# Patient Record
Sex: FEMALE | Race: WHITE | NOT HISPANIC OR LATINO | URBAN - METROPOLITAN AREA
[De-identification: names, ages, dates, MRNs, and addresses within clinical notes are randomized per-mention and may not be internally consistent; named-entity substitution may affect disease eponyms.]

---

## 2017-03-06 ENCOUNTER — OFFICE VISIT (OUTPATIENT)
Dept: WOUND CARE | Facility: HOSPITAL | Age: 82
End: 2017-03-06
Attending: SURGERY
Payer: MEDICARE

## 2017-03-06 VITALS
DIASTOLIC BLOOD PRESSURE: 64 MMHG | SYSTOLIC BLOOD PRESSURE: 111 MMHG | TEMPERATURE: 98 F | HEART RATE: 85 BPM | RESPIRATION RATE: 18 BRPM

## 2017-03-06 DIAGNOSIS — L89.623 STAGE III PRESSURE ULCER OF LEFT HEEL: ICD-10-CM

## 2017-03-06 DIAGNOSIS — L89.623 PRESSURE ULCER OF LEFT HEEL, STAGE 3: Primary | ICD-10-CM

## 2017-03-06 DIAGNOSIS — I73.9 PERIPHERAL VASCULAR DISEASE OF EXTREMITY: ICD-10-CM

## 2017-03-06 PROCEDURE — 27201912 HC WOUND CARE DEBRIDEMENT SUPPLIES

## 2017-03-06 PROCEDURE — 11045 DBRDMT SUBQ TISS EACH ADDL: CPT

## 2017-03-06 PROCEDURE — 11042 DBRDMT SUBQ TIS 1ST 20SQCM/<: CPT

## 2017-03-06 PROCEDURE — 99203 OFFICE O/P NEW LOW 30 MIN: CPT | Mod: 25

## 2017-03-06 RX ORDER — LEVOTHYROXINE SODIUM 75 UG/1
75 TABLET ORAL DAILY
COMMUNITY

## 2017-03-06 RX ORDER — MULTIVITAMIN
1 TABLET ORAL DAILY
COMMUNITY

## 2017-03-06 RX ORDER — LISINOPRIL 20 MG/1
20 TABLET ORAL DAILY
COMMUNITY

## 2017-03-06 RX ORDER — OLANZAPINE 5 MG/1
5 TABLET ORAL DAILY PRN
COMMUNITY

## 2017-03-06 RX ORDER — OLANZAPINE 20 MG/1
30 TABLET ORAL NIGHTLY
COMMUNITY

## 2017-03-06 RX ORDER — DOCUSATE SODIUM 100 MG/1
100 CAPSULE, LIQUID FILLED ORAL DAILY
COMMUNITY

## 2017-03-06 RX ORDER — ADHESIVE BANDAGE
30 BANDAGE TOPICAL DAILY PRN
COMMUNITY

## 2017-03-06 RX ORDER — ASPIRIN 81 MG/1
81 TABLET ORAL DAILY
COMMUNITY

## 2017-03-06 RX ORDER — DIVALPROEX SODIUM 250 MG/1
500 TABLET, FILM COATED, EXTENDED RELEASE ORAL EVERY 12 HOURS
COMMUNITY

## 2017-03-06 RX ORDER — OXYBUTYNIN CHLORIDE 10 MG/1
10 TABLET, EXTENDED RELEASE ORAL DAILY
COMMUNITY

## 2017-03-06 RX ORDER — ALBUTEROL SULFATE 0.63 MG/3ML
0.63 SOLUTION RESPIRATORY (INHALATION) 4 TIMES DAILY PRN
COMMUNITY

## 2017-03-06 RX ORDER — LANOLIN ALCOHOL/MO/W.PET/CERES
1000 CREAM (GRAM) TOPICAL DAILY
COMMUNITY

## 2017-03-06 RX ORDER — PRAVASTATIN SODIUM 40 MG/1
40 TABLET ORAL DAILY
COMMUNITY

## 2017-03-06 RX ORDER — ALENDRONATE SODIUM 70 MG/1
70 TABLET ORAL
COMMUNITY

## 2017-03-06 NOTE — PROGRESS NOTES
The documentation for this encounter was completed in [facility].Please see the scanned in documents for the details.    Tyler Rosa MD

## 2017-03-06 NOTE — MR AVS SNAPSHOT
[facility] [address] [place] [address]  Phone: [phone]  Fax: [phone]                  Mariya Contreras   3/6/2017 8:30 AM   Office Visit    Description:  Female : 1934   Provider:  Tyler Rosa MD   Department:  [facility]           Diagnoses this Visit        Comments    Pressure ulcer of left heel, stage 3    -  Primary     Peripheral vascular disease of extremity         Stage III pressure ulcer of left heel                To Do List           Goals (5 Years of Data)     None      [facility]     [facility] -  Assistance  Registered nurses in the [facility] provide clinical advisement, health education, appointment booking, and other advisory services.  Call for this free service at [phone].             Medications           Message regarding Medications     Verify the changes and/or additions to your medication regime listed below are the same as discussed with your clinician today.  If any of these changes or additions are incorrect, please notify your healthcare provider.             Verify that the below list of medications is an accurate representation of the medications you are currently taking.  If none reported, the list may be blank. If incorrect, please contact your healthcare provider. Carry this list with you in case of emergency.           Current Medications     albuterol (ACCUNEB) 0.63 mg/3 mL Nebu Take 0.63 mg by nebulization 4 (four) times daily as needed. Rescue    alendronate (FOSAMAX) 70 MG tablet Take 70 mg by mouth every 7 days.    ascorbic Acid (VITAMIN C) 500 mg CpSR Take 1,000 mg by mouth once daily.    aspirin (ECOTRIN) 81 MG EC tablet Take 81 mg by mouth once daily.    divalproex ER (DEPAKOTE ER) 250 MG 24 hr tablet Take 500 mg by mouth every 12 (twelve) hours.    docusate sodium (COLACE) 100 MG capsule Take 100 mg by mouth once daily.    levothyroxine (SYNTHROID)  75 MCG tablet Take 75 mcg by mouth once daily.    lisinopril (PRINIVIL,ZESTRIL) 20 MG tablet Take 20 mg by mouth once daily.    magnesium hydroxide 400 mg/5 ml (MILK OF MAGNESIA) 400 mg/5 mL Susp Take 30 mLs by mouth daily as needed.    multivitamin (ONE DAILY MULTIVITAMIN) per tablet Take 1 tablet by mouth once daily.    olanzapine (ZYPREXA) 20 MG tablet Take 30 mg by mouth every evening.    olanzapine (ZYPREXA) 5 MG tablet Take 5 mg by mouth daily as needed.    oxybutynin (DITROPAN-XL) 10 MG 24 hr tablet Take 10 mg by mouth once daily.    pravastatin (PRAVACHOL) 40 MG tablet Take 40 mg by mouth once daily.           Clinical Reference Information           Your Vitals Were     BP Pulse Temp Resp          111/64 85 98.1 °F (36.7 °C) (Temporal) 18        Blood Pressure          Most Recent Value    BP  111/64      Allergies as of 3/6/2017     No Known Allergies      Immunizations Administered on Date of Encounter - 3/6/2017     None      [facility] Sign-Up     Activating your [facility] account is as easy as 1-2-3!     1) Visit [email or web address], select Sign Up Now, enter this activation code and your date of birth, then select Next.  [email or web address]  Expires: 4/20/2017  5:53 PM      2) Create a username and password to use when you visit [facility] in the future and select a security question in case you lose your password and select Next.    3) Enter your e-mail address and click Sign Up!    Additional Information  If you have questions, please e-mail [email or web address] or call [phone] to talk to our [facility] staff. Remember, [facility] is NOT to be used for urgent needs. For medical emergencies, dial 911.         Smoking Cessation     If you would like to quit smoking:   You may be eligible for free services if you are a [place] resident and started smoking cigarettes before September 1, 1988.  Call the [employer]oking [employer]ess[employer] ([employer]) toll free at (547) 493-3911 or (029) 679-0237.   Call 1-800-QUIT-NOW if  you do not meet the above criteria.            Language Assistance Services     ATTENTION: Language assistance services are available, free of charge. Please call [phone].      ATENCIÓN: Si habla brando, tiene a hernandez disposición servicios gratuitos de asistencia lingüística. Llame al [phone].     CHÚ Ý: N?u b?n nói Ti?ng Vi?t, có các d?ch v? h? tr? ngôn ng? mi?n phí dành cho b?n. G?i s? [phone].         [facility] complies with applicable Federal civil rights laws and does not discriminate on the basis of race, color, national origin, age, disability, or sex.

## 2017-03-13 ENCOUNTER — OFFICE VISIT (OUTPATIENT)
Dept: WOUND CARE | Facility: HOSPITAL | Age: 82
End: 2017-03-13
Attending: SURGERY
Payer: MEDICARE

## 2017-03-13 VITALS — DIASTOLIC BLOOD PRESSURE: 99 MMHG | RESPIRATION RATE: 20 BRPM | SYSTOLIC BLOOD PRESSURE: 121 MMHG | HEART RATE: 80 BPM

## 2017-03-13 DIAGNOSIS — M24.572 EQUINUS CONTRACTURE OF LEFT ANKLE: ICD-10-CM

## 2017-03-13 DIAGNOSIS — I70.244 ATHEROSCLEROSIS OF NATIVE ARTERY OF LEFT LOWER EXTREMITY WITH ULCERATION OF HEEL: ICD-10-CM

## 2017-03-13 DIAGNOSIS — L89.624 STAGE IV PRESSURE ULCER OF LEFT HEEL: Primary | ICD-10-CM

## 2017-03-13 PROCEDURE — 11042 DBRDMT SUBQ TIS 1ST 20SQCM/<: CPT

## 2017-03-13 PROCEDURE — 11045 DBRDMT SUBQ TISS EACH ADDL: CPT

## 2017-03-13 PROCEDURE — 27201912 HC WOUND CARE DEBRIDEMENT SUPPLIES

## 2017-03-20 ENCOUNTER — OFFICE VISIT (OUTPATIENT)
Dept: WOUND CARE | Facility: HOSPITAL | Age: 82
End: 2017-03-20
Attending: SURGERY
Payer: MEDICARE

## 2017-03-20 VITALS — SYSTOLIC BLOOD PRESSURE: 116 MMHG | DIASTOLIC BLOOD PRESSURE: 58 MMHG | RESPIRATION RATE: 20 BRPM | HEART RATE: 83 BPM

## 2017-03-20 DIAGNOSIS — I70.244 ATHEROSCLEROSIS OF NATIVE ARTERY OF LEFT LOWER EXTREMITY WITH ULCERATION OF HEEL: ICD-10-CM

## 2017-03-20 DIAGNOSIS — L89.624 STAGE IV PRESSURE ULCER OF LEFT HEEL: Primary | ICD-10-CM

## 2017-03-20 PROCEDURE — 27201912 HC WOUND CARE DEBRIDEMENT SUPPLIES

## 2017-03-20 PROCEDURE — 11042 DBRDMT SUBQ TIS 1ST 20SQCM/<: CPT

## 2017-04-03 ENCOUNTER — OFFICE VISIT (OUTPATIENT)
Dept: WOUND CARE | Facility: HOSPITAL | Age: 82
End: 2017-04-03
Attending: SURGERY
Payer: MEDICARE

## 2017-04-03 VITALS — RESPIRATION RATE: 22 BRPM | DIASTOLIC BLOOD PRESSURE: 78 MMHG | SYSTOLIC BLOOD PRESSURE: 100 MMHG | HEART RATE: 73 BPM

## 2017-04-03 DIAGNOSIS — M24.572 EQUINUS CONTRACTURE OF LEFT ANKLE: ICD-10-CM

## 2017-04-03 DIAGNOSIS — I70.244 ATHEROSCLEROSIS OF NATIVE ARTERY OF LEFT LOWER EXTREMITY WITH ULCERATION OF HEEL: ICD-10-CM

## 2017-04-03 DIAGNOSIS — L89.624 STAGE IV PRESSURE ULCER OF LEFT HEEL: Primary | ICD-10-CM

## 2017-04-03 PROCEDURE — 27201912 HC WOUND CARE DEBRIDEMENT SUPPLIES

## 2017-04-03 PROCEDURE — 11042 DBRDMT SUBQ TIS 1ST 20SQCM/<: CPT

## 2017-04-10 ENCOUNTER — OFFICE VISIT (OUTPATIENT)
Dept: WOUND CARE | Facility: HOSPITAL | Age: 82
End: 2017-04-10
Attending: SURGERY
Payer: MEDICARE

## 2017-04-10 VITALS
HEART RATE: 88 BPM | SYSTOLIC BLOOD PRESSURE: 107 MMHG | RESPIRATION RATE: 18 BRPM | DIASTOLIC BLOOD PRESSURE: 61 MMHG | TEMPERATURE: 98 F

## 2017-04-10 DIAGNOSIS — I70.244 ATHEROSCLEROSIS OF NATIVE ARTERY OF LEFT LOWER EXTREMITY WITH ULCERATION OF HEEL: Primary | ICD-10-CM

## 2017-04-10 DIAGNOSIS — L89.624 STAGE IV PRESSURE ULCER OF LEFT HEEL: ICD-10-CM

## 2017-04-10 DIAGNOSIS — M24.572 EQUINUS CONTRACTURE OF LEFT ANKLE: ICD-10-CM

## 2017-04-10 DIAGNOSIS — I70.262 ATHEROSCLEROSIS OF NATIVE ARTERY OF LEFT LOWER EXTREMITY WITH GANGRENE: ICD-10-CM

## 2017-04-10 PROCEDURE — 27201912 HC WOUND CARE DEBRIDEMENT SUPPLIES

## 2017-04-10 PROCEDURE — 11043 DBRDMT MUSC&/FSCA 1ST 20/<: CPT

## 2017-05-01 ENCOUNTER — OFFICE VISIT (OUTPATIENT)
Dept: WOUND CARE | Facility: HOSPITAL | Age: 82
End: 2017-05-01
Attending: SURGERY
Payer: MEDICARE

## 2017-05-01 VITALS — RESPIRATION RATE: 20 BRPM | SYSTOLIC BLOOD PRESSURE: 98 MMHG | HEART RATE: 84 BPM | DIASTOLIC BLOOD PRESSURE: 61 MMHG

## 2017-05-01 DIAGNOSIS — M24.572 EQUINUS CONTRACTURE OF LEFT ANKLE: ICD-10-CM

## 2017-05-01 DIAGNOSIS — I70.244 ATHEROSCLEROSIS OF NATIVE ARTERY OF LEFT LOWER EXTREMITY WITH ULCERATION OF HEEL: ICD-10-CM

## 2017-05-01 DIAGNOSIS — L89.624 STAGE IV PRESSURE ULCER OF LEFT HEEL: Primary | ICD-10-CM

## 2017-05-01 DIAGNOSIS — I70.262 ATHEROSCLEROSIS OF NATIVE ARTERY OF LEFT LOWER EXTREMITY WITH GANGRENE: ICD-10-CM

## 2017-05-01 PROCEDURE — 27201912 HC WOUND CARE DEBRIDEMENT SUPPLIES

## 2017-05-01 PROCEDURE — 11042 DBRDMT SUBQ TIS 1ST 20SQCM/<: CPT

## 2017-05-29 ENCOUNTER — OFFICE VISIT (OUTPATIENT)
Dept: WOUND CARE | Facility: HOSPITAL | Age: 82
End: 2017-05-29
Attending: SURGERY
Payer: MEDICARE

## 2017-05-29 VITALS — RESPIRATION RATE: 20 BRPM | HEART RATE: 81 BPM | SYSTOLIC BLOOD PRESSURE: 96 MMHG | DIASTOLIC BLOOD PRESSURE: 62 MMHG

## 2017-05-29 DIAGNOSIS — L89.624 STAGE IV PRESSURE ULCER OF LEFT HEEL: Primary | ICD-10-CM

## 2017-05-29 DIAGNOSIS — M24.572 EQUINUS CONTRACTURE OF LEFT ANKLE: ICD-10-CM

## 2017-05-29 DIAGNOSIS — I70.244 ATHEROSCLEROSIS OF NATIVE ARTERY OF LEFT LOWER EXTREMITY WITH ULCERATION OF HEEL: ICD-10-CM

## 2017-05-29 PROCEDURE — 27201912 HC WOUND CARE DEBRIDEMENT SUPPLIES

## 2017-05-29 PROCEDURE — 11043 DBRDMT MUSC&/FSCA 1ST 20/<: CPT

## 2017-06-26 ENCOUNTER — OFFICE VISIT (OUTPATIENT)
Dept: WOUND CARE | Facility: HOSPITAL | Age: 82
End: 2017-06-26
Attending: SURGERY
Payer: MEDICARE

## 2017-06-26 VITALS
DIASTOLIC BLOOD PRESSURE: 77 MMHG | SYSTOLIC BLOOD PRESSURE: 132 MMHG | TEMPERATURE: 98 F | RESPIRATION RATE: 20 BRPM | HEART RATE: 91 BPM

## 2017-06-26 DIAGNOSIS — I70.25: ICD-10-CM

## 2017-06-26 DIAGNOSIS — I70.244 ATHEROSCLEROSIS OF NATIVE ARTERY OF LEFT LOWER EXTREMITY WITH ULCERATION OF HEEL: ICD-10-CM

## 2017-06-26 DIAGNOSIS — I73.9 PERIPHERAL VASCULAR DISEASE OF EXTREMITY: ICD-10-CM

## 2017-06-26 DIAGNOSIS — L89.624 STAGE IV PRESSURE ULCER OF LEFT HEEL: Primary | ICD-10-CM

## 2017-06-26 PROCEDURE — 11042 DBRDMT SUBQ TIS 1ST 20SQCM/<: CPT

## 2017-06-26 PROCEDURE — 99212 OFFICE O/P EST SF 10 MIN: CPT | Mod: 25

## 2017-06-26 PROCEDURE — 27201912 HC WOUND CARE DEBRIDEMENT SUPPLIES

## 2017-07-10 ENCOUNTER — OFFICE VISIT (OUTPATIENT)
Dept: WOUND CARE | Facility: HOSPITAL | Age: 82
End: 2017-07-10
Attending: SURGERY
Payer: MEDICARE

## 2017-07-10 VITALS — RESPIRATION RATE: 20 BRPM | HEART RATE: 90 BPM | SYSTOLIC BLOOD PRESSURE: 130 MMHG | DIASTOLIC BLOOD PRESSURE: 78 MMHG

## 2017-07-10 DIAGNOSIS — I70.244 ATHEROSCLEROSIS OF NATIVE ARTERY OF LEFT LOWER EXTREMITY WITH ULCERATION OF HEEL: ICD-10-CM

## 2017-07-10 DIAGNOSIS — L97.523: ICD-10-CM

## 2017-07-10 DIAGNOSIS — L89.624 STAGE IV PRESSURE ULCER OF LEFT HEEL: Primary | ICD-10-CM

## 2017-07-10 DIAGNOSIS — M24.572 EQUINUS CONTRACTURE OF LEFT ANKLE: ICD-10-CM

## 2017-07-10 PROCEDURE — 27201912 HC WOUND CARE DEBRIDEMENT SUPPLIES

## 2017-07-10 PROCEDURE — 11043 DBRDMT MUSC&/FSCA 1ST 20/<: CPT

## 2017-07-17 ENCOUNTER — OFFICE VISIT (OUTPATIENT)
Dept: WOUND CARE | Facility: HOSPITAL | Age: 82
End: 2017-07-17
Attending: SURGERY
Payer: MEDICARE

## 2017-07-17 VITALS
SYSTOLIC BLOOD PRESSURE: 98 MMHG | RESPIRATION RATE: 20 BRPM | HEART RATE: 94 BPM | DIASTOLIC BLOOD PRESSURE: 54 MMHG | TEMPERATURE: 98 F

## 2017-07-17 DIAGNOSIS — L89.624 STAGE IV PRESSURE ULCER OF LEFT HEEL: ICD-10-CM

## 2017-07-17 DIAGNOSIS — I70.25: ICD-10-CM

## 2017-07-17 DIAGNOSIS — I70.244 ATHEROSCLEROSIS OF NATIVE ARTERY OF LEFT LOWER EXTREMITY WITH ULCERATION OF HEEL: ICD-10-CM

## 2017-07-17 DIAGNOSIS — I73.9 PERIPHERAL VASCULAR DISEASE OF EXTREMITY: ICD-10-CM

## 2017-07-17 DIAGNOSIS — L89.623 PRESSURE ULCER OF LEFT HEEL, STAGE 3: ICD-10-CM

## 2017-07-17 DIAGNOSIS — L97.523: Primary | ICD-10-CM

## 2017-07-17 PROCEDURE — 27201912 HC WOUND CARE DEBRIDEMENT SUPPLIES

## 2017-07-17 PROCEDURE — 11042 DBRDMT SUBQ TIS 1ST 20SQCM/<: CPT

## 2017-08-07 ENCOUNTER — OFFICE VISIT (OUTPATIENT)
Dept: WOUND CARE | Facility: HOSPITAL | Age: 82
End: 2017-08-07
Attending: SURGERY
Payer: MEDICARE

## 2017-08-07 VITALS
RESPIRATION RATE: 20 BRPM | DIASTOLIC BLOOD PRESSURE: 69 MMHG | SYSTOLIC BLOOD PRESSURE: 111 MMHG | HEART RATE: 97 BPM | TEMPERATURE: 98 F

## 2017-08-07 DIAGNOSIS — L89.624 STAGE IV PRESSURE ULCER OF LEFT HEEL: Primary | ICD-10-CM

## 2017-08-07 PROCEDURE — 11044 DBRDMT BONE 1ST 20 SQ CM/<: CPT

## 2017-08-07 PROCEDURE — 27201912 HC WOUND CARE DEBRIDEMENT SUPPLIES

## 2017-08-28 ENCOUNTER — OFFICE VISIT (OUTPATIENT)
Dept: WOUND CARE | Facility: HOSPITAL | Age: 82
End: 2017-08-28
Attending: SURGERY
Payer: MEDICARE

## 2017-08-28 VITALS
RESPIRATION RATE: 18 BRPM | HEART RATE: 88 BPM | SYSTOLIC BLOOD PRESSURE: 138 MMHG | TEMPERATURE: 98 F | DIASTOLIC BLOOD PRESSURE: 82 MMHG

## 2017-08-28 DIAGNOSIS — L89.624 STAGE IV PRESSURE ULCER OF LEFT HEEL: Primary | ICD-10-CM

## 2017-08-28 DIAGNOSIS — I73.9 PERIPHERAL VASCULAR DISEASE OF EXTREMITY: ICD-10-CM

## 2017-08-28 DIAGNOSIS — I70.244 ATHEROSCLEROSIS OF NATIVE ARTERY OF LEFT LOWER EXTREMITY WITH ULCERATION OF HEEL: ICD-10-CM

## 2017-08-28 DIAGNOSIS — L97.523: ICD-10-CM

## 2017-08-28 PROCEDURE — 11042 DBRDMT SUBQ TIS 1ST 20SQCM/<: CPT

## 2017-08-28 PROCEDURE — 27201912 HC WOUND CARE DEBRIDEMENT SUPPLIES

## 2017-09-18 ENCOUNTER — OFFICE VISIT (OUTPATIENT)
Dept: WOUND CARE | Facility: HOSPITAL | Age: 82
End: 2017-09-18
Attending: SURGERY
Payer: MEDICARE

## 2017-09-18 VITALS
TEMPERATURE: 98 F | HEART RATE: 89 BPM | DIASTOLIC BLOOD PRESSURE: 57 MMHG | SYSTOLIC BLOOD PRESSURE: 80 MMHG | RESPIRATION RATE: 18 BRPM

## 2017-09-18 DIAGNOSIS — L89.624 STAGE IV PRESSURE ULCER OF LEFT HEEL: Primary | ICD-10-CM

## 2017-09-18 DIAGNOSIS — I70.244 ATHEROSCLEROSIS OF NATIVE ARTERY OF LEFT LOWER EXTREMITY WITH ULCERATION OF HEEL: ICD-10-CM

## 2017-09-18 DIAGNOSIS — L97.523: ICD-10-CM

## 2017-09-18 DIAGNOSIS — I73.9 PERIPHERAL VASCULAR DISEASE OF EXTREMITY: ICD-10-CM

## 2017-09-18 DIAGNOSIS — L89.623 PRESSURE ULCER OF LEFT HEEL, STAGE 3: ICD-10-CM

## 2017-09-18 DIAGNOSIS — I70.25: ICD-10-CM

## 2017-09-18 PROCEDURE — 27201912 HC WOUND CARE DEBRIDEMENT SUPPLIES

## 2017-09-18 PROCEDURE — 11042 DBRDMT SUBQ TIS 1ST 20SQCM/<: CPT

## 2017-09-18 NOTE — PROGRESS NOTES
The documentation for this encounter was completed in [facility].Please see the scanned in documents for the details.    yTler Rosa MD     See wound care assessment documentation in chart review. Scanned under media tab.

## 2017-10-16 ENCOUNTER — OFFICE VISIT (OUTPATIENT)
Dept: WOUND CARE | Facility: HOSPITAL | Age: 82
End: 2017-10-16
Attending: SURGERY
Payer: MEDICARE

## 2017-10-16 VITALS
HEART RATE: 68 BPM | RESPIRATION RATE: 20 BRPM | TEMPERATURE: 98 F | SYSTOLIC BLOOD PRESSURE: 124 MMHG | DIASTOLIC BLOOD PRESSURE: 76 MMHG

## 2017-10-16 DIAGNOSIS — L89.624 STAGE IV PRESSURE ULCER OF LEFT HEEL: ICD-10-CM

## 2017-10-16 PROCEDURE — 27201912 HC WOUND CARE DEBRIDEMENT SUPPLIES

## 2017-10-16 PROCEDURE — 11042 DBRDMT SUBQ TIS 1ST 20SQCM/<: CPT

## 2017-10-16 NOTE — PROGRESS NOTES
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  Patient has significant peripheral vascular disease.  She underwent evaluation including angiogram and there was significant disease and no intervention was possible.  Patient has been placed on conservative therapy.  There is been no progression of the wound and actually there is been some granulation tissue which is formed over the bone.  There is been no signs of infection.  Patient does not complain of any significant pain other than we change the dressing or debride the wound.  There is no significant drainage.  Amputation is been offered but the family does not want to pursue amputation at this time.           Other Visit Diagnoses    None.         See wound doc progress notes. Documents will be scanned.        Tyler Rosa  [facility]

## 2017-11-13 ENCOUNTER — OFFICE VISIT (OUTPATIENT)
Dept: WOUND CARE | Facility: HOSPITAL | Age: 82
End: 2017-11-13
Attending: SURGERY
Payer: MEDICARE

## 2017-11-13 VITALS
TEMPERATURE: 99 F | SYSTOLIC BLOOD PRESSURE: 95 MMHG | HEART RATE: 90 BPM | RESPIRATION RATE: 20 BRPM | DIASTOLIC BLOOD PRESSURE: 64 MMHG

## 2017-11-13 DIAGNOSIS — L89.624 STAGE IV PRESSURE ULCER OF LEFT HEEL: ICD-10-CM

## 2017-11-13 PROCEDURE — 11042 DBRDMT SUBQ TIS 1ST 20SQCM/<: CPT

## 2017-11-13 PROCEDURE — 27201912 HC WOUND CARE DEBRIDEMENT SUPPLIES

## 2017-11-13 NOTE — PROGRESS NOTES
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  Patient has significant peripheral vascular disease.  She underwent evaluation including angiogram and there was significant disease and no intervention was possible.  Patient has been placed on conservative therapy.  There is been no progression of the wound and actually there is been some granulation tissue which is formed over the bone.  There is been no signs of infection.  Patient does not complain of any significant pain other than we change the dressing or debride the wound.  There is no significant drainage.  Amputation is been offered but the family does not want to pursue amputation at this time.  There is no worsening of the wounds.  There is no signs of infection.               See wound doc progress notes. Documents will be scanned.        Tyler Rosa  [facility]

## 2017-12-11 ENCOUNTER — OFFICE VISIT (OUTPATIENT)
Dept: WOUND CARE | Facility: HOSPITAL | Age: 82
End: 2017-12-11
Attending: SURGERY
Payer: MEDICARE

## 2017-12-11 VITALS
SYSTOLIC BLOOD PRESSURE: 132 MMHG | TEMPERATURE: 98 F | DIASTOLIC BLOOD PRESSURE: 84 MMHG | RESPIRATION RATE: 20 BRPM | HEART RATE: 88 BPM

## 2017-12-11 DIAGNOSIS — L97.524: ICD-10-CM

## 2017-12-11 DIAGNOSIS — M24.572 EQUINUS CONTRACTURE OF LEFT ANKLE: ICD-10-CM

## 2017-12-11 DIAGNOSIS — L89.624 STAGE IV PRESSURE ULCER OF LEFT HEEL: Primary | ICD-10-CM

## 2017-12-11 DIAGNOSIS — I70.244 ATHEROSCLEROSIS OF NATIVE ARTERY OF LEFT LOWER EXTREMITY WITH ULCERATION OF HEEL: ICD-10-CM

## 2017-12-11 PROBLEM — I70.262 ATHEROSCLEROSIS OF NATIVE ARTERY OF LEFT LOWER EXTREMITY WITH GANGRENE: Status: RESOLVED | Noted: 2017-04-10 | Resolved: 2017-12-11

## 2017-12-11 PROCEDURE — 11044 DBRDMT BONE 1ST 20 SQ CM/<: CPT

## 2017-12-11 PROCEDURE — 27201912 HC WOUND CARE DEBRIDEMENT SUPPLIES

## 2017-12-11 NOTE — PROGRESS NOTES
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  Patient has significant peripheral vascular disease.  She underwent evaluation including angiogram and there was significant disease and no intervention was possible.  Patient has been placed on conservative therapy.  There is been GOOD progression of the wound and actually there has been granulation tissue which has formed over the bone.  There has been no signs of infection.  Patient does not complain of any significant pain other than we change the dressing or debride the wound.  There is no significant drainage.  Amputation had been offered but the family does not want to pursue amputation.  There is no signs of infection.         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 Documents will be scanned.        Mic Landon  [facility]

## 2018-01-01 ENCOUNTER — OFFICE VISIT (OUTPATIENT)
Dept: WOUND CARE | Facility: HOSPITAL | Age: 83
End: 2018-01-01
Attending: SURGERY
Payer: MEDICARE

## 2018-01-01 VITALS
RESPIRATION RATE: 18 BRPM | TEMPERATURE: 98 F | HEART RATE: 88 BPM | SYSTOLIC BLOOD PRESSURE: 142 MMHG | DIASTOLIC BLOOD PRESSURE: 87 MMHG

## 2018-01-01 VITALS — HEART RATE: 56 BPM | SYSTOLIC BLOOD PRESSURE: 131 MMHG | DIASTOLIC BLOOD PRESSURE: 77 MMHG

## 2018-01-01 VITALS
TEMPERATURE: 98 F | HEART RATE: 88 BPM | DIASTOLIC BLOOD PRESSURE: 78 MMHG | SYSTOLIC BLOOD PRESSURE: 132 MMHG | RESPIRATION RATE: 18 BRPM

## 2018-01-01 VITALS — DIASTOLIC BLOOD PRESSURE: 68 MMHG | HEART RATE: 83 BPM | RESPIRATION RATE: 20 BRPM | SYSTOLIC BLOOD PRESSURE: 109 MMHG

## 2018-01-01 VITALS
HEART RATE: 77 BPM | TEMPERATURE: 98 F | RESPIRATION RATE: 18 BRPM | DIASTOLIC BLOOD PRESSURE: 76 MMHG | SYSTOLIC BLOOD PRESSURE: 132 MMHG

## 2018-01-01 VITALS
TEMPERATURE: 98 F | DIASTOLIC BLOOD PRESSURE: 72 MMHG | HEART RATE: 74 BPM | RESPIRATION RATE: 18 BRPM | SYSTOLIC BLOOD PRESSURE: 124 MMHG

## 2018-01-01 VITALS
DIASTOLIC BLOOD PRESSURE: 77 MMHG | TEMPERATURE: 98 F | RESPIRATION RATE: 18 BRPM | HEART RATE: 82 BPM | SYSTOLIC BLOOD PRESSURE: 132 MMHG

## 2018-01-01 DIAGNOSIS — L97.524: ICD-10-CM

## 2018-01-01 DIAGNOSIS — L89.624 STAGE IV PRESSURE ULCER OF LEFT HEEL: ICD-10-CM

## 2018-01-01 DIAGNOSIS — M24.572 EQUINUS CONTRACTURE OF LEFT ANKLE: ICD-10-CM

## 2018-01-01 DIAGNOSIS — L89.624 STAGE IV PRESSURE ULCER OF LEFT HEEL: Primary | ICD-10-CM

## 2018-01-01 DIAGNOSIS — M24.572 EQUINUS CONTRACTURE OF LEFT ANKLE: Primary | ICD-10-CM

## 2018-01-01 DIAGNOSIS — I70.244 ATHEROSCLEROSIS OF NATIVE ARTERY OF LEFT LOWER EXTREMITY WITH ULCERATION OF HEEL: ICD-10-CM

## 2018-01-01 DIAGNOSIS — I70.244 ATHEROSCLEROSIS OF NATIVE ARTERY OF LEFT LOWER EXTREMITY WITH ULCERATION OF HEEL: Primary | ICD-10-CM

## 2018-01-01 PROCEDURE — 99212 OFFICE O/P EST SF 10 MIN: CPT | Mod: 25

## 2018-01-01 PROCEDURE — 11042 DBRDMT SUBQ TIS 1ST 20SQCM/<: CPT

## 2018-01-01 PROCEDURE — 97597 DBRDMT OPN WND 1ST 20 CM/<: CPT

## 2018-01-01 PROCEDURE — 99499 UNLISTED E&M SERVICE: CPT | Mod: ,,, | Performed by: SURGERY

## 2018-01-01 PROCEDURE — 27201912 HC WOUND CARE DEBRIDEMENT SUPPLIES

## 2018-01-01 PROCEDURE — 99211 OFF/OP EST MAY X REQ PHY/QHP: CPT

## 2018-01-08 ENCOUNTER — OFFICE VISIT (OUTPATIENT)
Dept: WOUND CARE | Facility: HOSPITAL | Age: 83
End: 2018-01-08
Attending: SURGERY
Payer: MEDICARE

## 2018-01-08 VITALS
SYSTOLIC BLOOD PRESSURE: 104 MMHG | DIASTOLIC BLOOD PRESSURE: 63 MMHG | TEMPERATURE: 98 F | HEART RATE: 87 BPM | RESPIRATION RATE: 20 BRPM

## 2018-01-08 DIAGNOSIS — L89.624 STAGE IV PRESSURE ULCER OF LEFT HEEL: Primary | ICD-10-CM

## 2018-01-08 PROCEDURE — 99211 OFF/OP EST MAY X REQ PHY/QHP: CPT

## 2018-01-08 NOTE — PROGRESS NOTES
[facility]  Wound Care  Progress Note    Problem List Items Addressed This Visit     None          See wound doc progress notes. Documents will be scanned.        Tyler Rosa  [facility]

## 2018-01-08 NOTE — PROGRESS NOTES
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  Patient has significant peripheral vascular disease.  She underwent evaluation including angiogram and there was significant disease and no intervention was possible.  Patient has been placed on conservative therapy.  There is been GOOD progression of the wound and actually there has been granulation tissue which has formed over the bone.  There has been no signs of infection.  Patient does not complain of any significant pain other than we change the dressing or debride the wound.  There is no significant drainage.  Amputation had been offered but the family does not want to pursue amputation.  There is no signs of infection.               See wound doc progress notes. Documents will be scanned.        Tyler Rosa  [facility]

## 2018-02-05 ENCOUNTER — OFFICE VISIT (OUTPATIENT)
Dept: WOUND CARE | Facility: HOSPITAL | Age: 83
End: 2018-02-05
Attending: SURGERY
Payer: MEDICARE

## 2018-02-05 VITALS
TEMPERATURE: 98 F | DIASTOLIC BLOOD PRESSURE: 66 MMHG | SYSTOLIC BLOOD PRESSURE: 103 MMHG | HEART RATE: 98 BPM | RESPIRATION RATE: 18 BRPM

## 2018-02-05 DIAGNOSIS — L89.624 STAGE IV PRESSURE ULCER OF LEFT HEEL: ICD-10-CM

## 2018-02-05 PROCEDURE — 17250 CHEM CAUT OF GRANLTJ TISSUE: CPT

## 2018-02-05 NOTE — PROGRESS NOTES
Anam Temple is a 62 y.o. female. Presents today for   Chief Complaint   Patient presents with   • Hypertension     pt here for 3 month follow up visit   • Back Pain     pt is being weaned off hydrocodone, she said she can not handle the lower sig, and she would like pain management referral.       Hypertension   This is a chronic problem. The current episode started more than 1 year ago. The problem is unchanged. The problem is controlled. Pertinent negatives include no chest pain, peripheral edema, PND or shortness of breath. There are no associated agents to hypertension. Past treatments include ACE inhibitors and beta blockers. The current treatment provides moderate improvement. There are no compliance problems.    Back Pain   This is a chronic problem. The current episode started more than 1 month ago. The problem occurs constantly. The problem has been rapidly worsening since onset. The pain is present in the lumbar spine. The quality of the pain is described as aching. The pain does not radiate. Pertinent negatives include no chest pain. (Patient has been sick with sinusitis and since coming off pain medications;  ) Treatments tried: weaning off HC and wants off, but having hard time tolerating;  Aching everywhere.  ON nsaids with a little relief;         Review of Systems   Respiratory: Negative for shortness of breath.    Cardiovascular: Negative for chest pain and PND.   Musculoskeletal: Positive for back pain.       The following portions of the patient's history were reviewed and updated as appropriate: allergies, current medications, past medical history and problem list.    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  Patient has significant peripheral vascular disease.  She underwent evaluation including angiogram and there was significant disease and no intervention was possible.  Patient has been placed on conservative therapy.  There is been GOOD progression of the wound and actually there has been granulation tissue which has formed over the bone.  There has been no signs of infection.  Patient does not complain of any significant pain other than we change the dressing or debride the wound.  There is no significant drainage. There is no signs of infection.  She was offered amputation at one point.  Wound over the past several weeks is actually shown signs of healing.  Continue current wound care.               See wound doc progress notes. Documents will be scanned.        Tyler Rosa  [facility]   Menopausal flushing   • Tendinitis of shoulder   • Scapulocostal syndrome   • Bruit   • Allergic rhinitis       Allergies   Allergen Reactions   • Cetirizine    • Gabapentin    • Penicillins    • Sulfa Antibiotics    • Venlafaxine        Current Outpatient Prescriptions on File Prior to Visit   Medication Sig Dispense Refill   • atorvastatin (LIPITOR) 40 MG tablet Take 1 tablet by mouth Daily. 30 tablet 12   • Azelastine-Fluticasone 137-50 MCG/ACT suspension 1 spray into each nostril 2 (two) times a day 1 bottle 2   • busPIRone (BUSPAR) 5 MG tablet TAKE 1 TABLET BY MOUTH THREE TIMES A DAY  90 tablet 0   • citalopram (CeleXA) 40 MG tablet TAKE 1 TABLET BY MOUTH ONE TIME A DAY  30 tablet 6   • CloNIDine (CATAPRES) 0.1 MG tablet TAKE 1 TABLET BY MOUTH TWO TIMES A DAY  60 tablet 4   • diclofenac (VOLTAREN) 1 % gel gel Apply 4 g topically 4 (Four) Times a Day As Needed (hands). 100 g 5   • lisinopril (PRINIVIL,ZESTRIL) 40 MG tablet Take 40 mg by mouth daily        • metoprolol succinate XL (TOPROL XL) 25 MG 24 hr tablet Take 1 tablet by mouth Daily. 30 tablet 5   • montelukast (SINGULAIR) 10 MG tablet TAKE 1 TABLET BY MOUTH AT BEDTIME  30 tablet 11   • naproxen (NAPROSYN) 500 MG tablet Take 1 tablet by mouth 2 (Two) Times a Day With Meals. 60 tablet 5   • norethindrone-ethinyl estradiol (FEMHRT 1/5) 1-5 MG-MCG tablet Take 1 tablet by mouth daily        • TiZANidine (ZANAFLEX) 4 MG capsule Take 1 capsule by mouth 3 (Three) Times a Day. 90 capsule 1   • [DISCONTINUED] benzonatate (TESSALON) 200 MG capsule      • [DISCONTINUED] ciprofloxacin (CIPRO) 500 MG tablet Take 1 tablet by mouth 2 (Two) Times a Day. 14 tablet 0   • [DISCONTINUED] HYDROcodone-acetaminophen (NORCO) 5-325 MG per tablet 1 every 8 hours 90 tablet 0   • [DISCONTINUED] MethylPREDNISolone (MEDROL, ZHEN,) 4 MG tablet      • [DISCONTINUED] promethazine-dextromethorphan (PROMETHAZINE-DM) 6.25-15 MG/5ML syrup        No current facility-administered medications on  "file prior to visit.        Objective   Vitals:    10/19/17 1314   BP: 146/80   BP Location: Left arm   Patient Position: Sitting   Cuff Size: Large Adult   Pulse: 52   Temp: 97.8 °F (36.6 °C)   TempSrc: Oral   SpO2: 98%   Weight: 216 lb (98 kg)   Height: 70.5\" (179.1 cm)       Physical Exam   Constitutional: She is oriented to person, place, and time. She appears well-developed and well-nourished.   Neurological: She is alert and oriented to person, place, and time.   Skin: Skin is warm and dry.   Psychiatric: She has a normal mood and affect. Her behavior is normal.   Nursing note and vitals reviewed.      Assessment/Plan   Allie was seen today for hypertension and back pain.    Diagnoses and all orders for this visit:    Benign essential hypertension    Degeneration of intervertebral disc of lumbar region  -     methylPREDNISolone acetate (DEPO-medrol) injection 80 mg; Inject 1 mL into the shoulder, thigh, or buttocks 1 (One) Time.    DDD (degenerative disc disease), cervical  -     Discontinue: HYDROcodone-acetaminophen (NORCO) 5-325 MG per tablet; 1 every 8 hours  -     HYDROcodone-acetaminophen (NORCO) 5-325 MG per tablet; 1 every 8 hours  -     methylPREDNISolone acetate (DEPO-medrol) injection 80 mg; Inject 1 mL into the shoulder, thigh, or buttocks 1 (One) Time.  -     ketorolac (TORADOL) injection 60 mg; Inject 60 mg into the shoulder, thigh, or buttocks 1 (One) Time.    Chronic pain syndrome  -     Discontinue: HYDROcodone-acetaminophen (NORCO) 5-325 MG per tablet; 1 every 8 hours  -     HYDROcodone-acetaminophen (NORCO) 5-325 MG per tablet; 1 every 8 hours  -     ketorolac (TORADOL) injection 60 mg; Inject 60 mg into the shoulder, thigh, or buttocks 1 (One) Time.    Scapulocostal syndrome, right  -     Discontinue: HYDROcodone-acetaminophen (NORCO) 5-325 MG per tablet; 1 every 8 hours  -     HYDROcodone-acetaminophen (NORCO) 5-325 MG per tablet; 1 every 8 hours  -     methylPREDNISolone acetate " (DEPO-medrol) injection 80 mg; Inject 1 mL into the shoulder, thigh, or buttocks 1 (One) Time.  -     ketorolac (TORADOL) injection 60 mg; Inject 60 mg into the shoulder, thigh, or buttocks 1 (One) Time.    Primary osteoarthritis of both hands  -     Discontinue: HYDROcodone-acetaminophen (NORCO) 5-325 MG per tablet; 1 every 8 hours  -     HYDROcodone-acetaminophen (NORCO) 5-325 MG per tablet; 1 every 8 hours  -     methylPREDNISolone acetate (DEPO-medrol) injection 80 mg; Inject 1 mL into the shoulder, thigh, or buttocks 1 (One) Time.  -     ketorolac (TORADOL) injection 60 mg; Inject 60 mg into the shoulder, thigh, or buttocks 1 (One) Time.    -HC/apap;  Reports down to 2.5 daily;  Will give 1 tid for 1 more month, but try hold at 2.5 daily, then in 30 days will go down to 1 po bid #60.  -today will give tordol and depo medrol to see if helps  -continue clonidine, bp up a little but is in pain today         -Follow up: 3 months       Current Outpatient Prescriptions:   •  atorvastatin (LIPITOR) 40 MG tablet, Take 1 tablet by mouth Daily., Disp: 30 tablet, Rfl: 12  •  Azelastine-Fluticasone 137-50 MCG/ACT suspension, 1 spray into each nostril 2 (two) times a day, Disp: 1 bottle, Rfl: 2  •  busPIRone (BUSPAR) 5 MG tablet, TAKE 1 TABLET BY MOUTH THREE TIMES A DAY , Disp: 90 tablet, Rfl: 0  •  citalopram (CeleXA) 40 MG tablet, TAKE 1 TABLET BY MOUTH ONE TIME A DAY , Disp: 30 tablet, Rfl: 6  •  CloNIDine (CATAPRES) 0.1 MG tablet, TAKE 1 TABLET BY MOUTH TWO TIMES A DAY , Disp: 60 tablet, Rfl: 4  •  diclofenac (VOLTAREN) 1 % gel gel, Apply 4 g topically 4 (Four) Times a Day As Needed (hands)., Disp: 100 g, Rfl: 5  •  HYDROcodone-acetaminophen (NORCO) 5-325 MG per tablet, 1 every 8 hours, Disp: 90 tablet, Rfl: 0  •  lisinopril (PRINIVIL,ZESTRIL) 40 MG tablet, Take 40 mg by mouth daily  , Disp: , Rfl:   •  metoprolol succinate XL (TOPROL XL) 25 MG 24 hr tablet, Take 1 tablet by mouth Daily., Disp: 30 tablet, Rfl: 5  •   montelukast (SINGULAIR) 10 MG tablet, TAKE 1 TABLET BY MOUTH AT BEDTIME , Disp: 30 tablet, Rfl: 11  •  naproxen (NAPROSYN) 500 MG tablet, Take 1 tablet by mouth 2 (Two) Times a Day With Meals., Disp: 60 tablet, Rfl: 5  •  norethindrone-ethinyl estradiol (FEMHRT 1/5) 1-5 MG-MCG tablet, Take 1 tablet by mouth daily  , Disp: , Rfl:   •  TiZANidine (ZANAFLEX) 4 MG capsule, Take 1 capsule by mouth 3 (Three) Times a Day., Disp: 90 capsule, Rfl: 1    Current Facility-Administered Medications:   •  ketorolac (TORADOL) injection 60 mg, 60 mg, Intramuscular, Once, Harman Khan DO

## 2018-03-05 NOTE — ASSESSMENT & PLAN NOTE
The wound overall is doing quite well.  Bone is covered by granulation tissue.  Continue alcohol and Betadine to the wound.  Palliative management.  Offloading.

## 2018-04-02 NOTE — PROGRESS NOTES
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  Patient has significant peripheral vascular disease.  She underwent evaluation including angiogram and there was significant disease and no intervention was possible.  Amputated was offered sure in the course of treatment.  Patient has been placed on conservative/palliative therapy.  There has been GOOD progression of the wound and actually there has been granulation tissue which has formed over the bone.  There have been no signs of infection.  Patient does not complain of any significant pain other than when the dressing is changed.  There is no significant drainage.  Wound continues to decrease in size               See wound doc progress notes. Documents will be scanned.        Tyler Rosa  [facility]

## 2018-04-30 NOTE — PROGRESS NOTES
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  Patient has significant peripheral vascular disease.  She underwent evaluation including angiogram and there was significant disease and no intervention was possible.  Amputated was offered sure in the course of treatment.  Patient has been placed on conservative/palliative therapy.  There has been GOOD progression of the wound and actually there has been granulation tissue which has formed over the bone.  There have been no signs of infection.  Patient does not complain of any significant pain other than when the dressing is changed.  There is no significant drainage.  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  This had healed in the course of treatment.   This wound appears resolved               See wound doc progress notes. Documents will be scanned.        Tyler Rosa  [facility]

## 2018-06-04 PROBLEM — L97.524: Status: RESOLVED | Noted: 2017-07-10 | Resolved: 2018-01-01

## 2018-07-02 NOTE — PROGRESS NOTES
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  Patient has significant peripheral vascular disease.  She underwent evaluation including angiogram and there was significant disease and no intervention was possible.  Amputated was offered sure in the course of treatment.  Patient has been placed on conservative/palliative therapy.  There has been GOOD progression of the wound and actually there has been granulation tissue which has formed over the bone.  There have been no signs of infection.  Patient does not complain of any significant pain other than when the dressing is changed.  There is no significant drainage.  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 Documents will be scanned.        Mic Landon  [facility]

## 2018-08-06 NOTE — PROGRESS NOTES
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  Patient has significant peripheral vascular disease.  She underwent evaluation including angiogram and there was significant disease and no intervention was possible.  Amputated was offered sure in the course of treatment.  Patient has been placed on conservative/palliative therapy.     There has been GOOD progression of the wound.  There have been no signs of infection.  Patient does not complain of any significant pain other than when the dressing is changed.  There is no significant drainage.           Current Assessment & Plan     Continue conservative measures with Betadine.            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 Documents will be scanned.        Mic Landon  [facility]

## 2019-08-20 NOTE — PATIENT INSTRUCTIONS
PT DAILY TREATMENT NOTE 2-15    Patient Name: Charmaine Byrd  Date:2019  : 1977  [x]  Patient  Verified  Payor: Iris Lefort / Plan: [employer] HMO / Product Type: HMO /    In time: 9:35 am  Out time:  10:40 am  Total Treatment Time (min): 65  Visit #:  5    Treatment Area: Low back pain [M54.5]    SUBJECTIVE  Pain Level (0-10 scale): 0  Any medication changes, allergies to medications, adverse drug reactions, diagnosis change, or new procedure performed?: [x] No    [] Yes (see summary sheet for update)  Subjective functional status/changes:   [] No changes reported  \"I don't have any pain. \"    OBJECTIVE    Modality rationale: decrease inflammation and decrease pain to improve the patients ability to bend, stand prolonged periods. Min Type Additional Details       [] Estim: []Att   []Unatt    []TENS instruct                  []IFC  []Premod   []NMES                     []Other:  []w/US   []w/ice   []w/heat  Position:  Location:       []  Traction: [] Cervical       []Lumbar                       [] Prone          []Supine                       []Intermittent   []Continuous Lbs:  [] before manual  [] after manual  []w/heat    []  Ultrasound: []Continuous   [] Pulsed                       at: []1MHz   []3MHz Location:  W/cm2:    [] Paraffin         Location:   []w/heat   10 [x]  Ice     []  Heat  []  Ice massage Position: Hook lying  Location:  Low back    []  Laser  []  Other: Position:  Location:      []  Vasopneumatic Device Pressure:       [] lo [] med [] hi   Temperature:      [x] Skin assessment post-treatment:  [x]intact []redness- no adverse reaction    []redness  adverse reaction:    55 min Therapeutic Exercise:  [x] See flow sheet :     Rationale: increase ROM and increase strength to improve the patients ability to bend, stand prolonged periods.           With   [x] TE   [] TA   [] neuro   [] other: Patient Education: [x] Review HEP    [] Progressed/Changed HEP based on:   [] positioning Please see wound care instructions which have been provided separately.      [] body mechanics   [] transfers   [] heat/ice application    [] other:      Other Objective/Functional Measures:      Pain Level (0-10 scale) post treatment: 0    ASSESSMENT/Changes in Function:  Low back soreness with bridging, but able to perform in limited ROM. Patient will continue to benefit from skilled PT services to modify and progress therapeutic interventions, address functional mobility deficits, address ROM deficits, address strength deficits, analyze and address soft tissue restrictions, analyze and cue movement patterns, analyze and modify body mechanics/ergonomics and assess and modify postural abnormalities to attain remaining goals.      []  See Plan of Care  []  See progress note/recertification  []  See Discharge Summary         Progress towards goals / Updated goals:  nt    PLAN  [x]  Upgrade activities as tolerated     [x]  Continue plan of care  []  Update interventions per flow sheet       []  Discharge due to:_  []  Other:_      Jessie Infante, PT 8/20/2019

## 2020-11-17 NOTE — PROGRESS NOTES
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  Patient has significant peripheral vascular disease.  She underwent evaluation including angiogram and there was significant disease and no intervention was possible.  Amputated was offered sure in the course of treatment.  Patient has been placed on conservative/palliative therapy.  There has been GOOD progression of the wound and actually there has been granulation tissue which has formed over the bone.  There have been no signs of infection.  Patient does not complain of any significant pain other than when the dressing is changed.  There is no significant drainage.         Current Assessment & Plan     The wound overall is doing quite well.  Bone is covered by granulation tissue.  Continue alcohol and Betadine to the wound.  Palliative management.  Offloading.         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  This had healed in the course of treatment.  There is noted to be some drainage from the site of this wound under a large callus and eschar.         Current Assessment & Plan     Continue alcohol and Betadine.               See wound doc progress notes. Documents will be scanned.        Mic Landon  [facility]  
See wound care assessment documentation in chart review. Scanned under media tab.     
Vital signs noted, see flowsheet.  General: NAD, well appearing and non-toxic.  HEENT: NC/AT. MMM. No nasal discharge, throat clear. Uvula midline. no broken teeth or oral laceration seen. Conjunctiva and sclera clear b/l.  EOMI. PERRL.  Neck: Soft and supple, full ROM without pain on movement but +TTP over   Cardiac: RRR. +S1/S2. Peripheral pulses 2+ and symmetric b/l. No LE edema.  Respiratory: Speaking in full sentences, no evidence of respiratory distress. Lungs CTA b/l, no wheezes/rhonchi/rales/stridor.   Abdomen: Normoactive bowel sounds x 4 quadrants. Soft, NTND. No guarding or rebound tenderness. No suprapubic tenderness.  Back: Spine midline and non-tender. No CVAT.  MSK: +TTP trapezius muscles and paraspinal thoracic and right lower lumbar region, no midline tenderness or joint tenderness to palpation.  Skin: Normal color for race, no evidence of rash, ecchymosis, cyanosis or jaundice.   Lymph: No cervical lymphadenopathy  Neuro: Awake, alert and oriented to person/place/time/situation. Moves all extremities spontaneously and symmetrically.  No focal deficits or facial droop.  CN II-XII intact.   5/5 strength in upper and lower extremities, ambulating well, able to bend and twist at waist with ease.